# Patient Record
Sex: FEMALE | Race: OTHER
[De-identification: names, ages, dates, MRNs, and addresses within clinical notes are randomized per-mention and may not be internally consistent; named-entity substitution may affect disease eponyms.]

---

## 2019-04-11 ENCOUNTER — HOSPITAL ENCOUNTER (EMERGENCY)
Dept: HOSPITAL 54 - ER | Age: 44
Discharge: HOME | End: 2019-04-11
Payer: MEDICAID

## 2019-04-11 VITALS — HEIGHT: 66 IN | BODY MASS INDEX: 33.75 KG/M2 | WEIGHT: 210 LBS

## 2019-04-11 VITALS — SYSTOLIC BLOOD PRESSURE: 132 MMHG | DIASTOLIC BLOOD PRESSURE: 72 MMHG

## 2019-04-11 DIAGNOSIS — R42: ICD-10-CM

## 2019-04-11 DIAGNOSIS — R07.89: Primary | ICD-10-CM

## 2019-04-11 DIAGNOSIS — R51: ICD-10-CM

## 2019-04-11 LAB
ALBUMIN SERPL BCP-MCNC: 4 G/DL (ref 3.4–5)
ALP SERPL-CCNC: 58 U/L (ref 46–116)
ALT SERPL W P-5'-P-CCNC: 18 U/L (ref 12–78)
AST SERPL W P-5'-P-CCNC: 12 U/L (ref 15–37)
BASOPHILS # BLD AUTO: 0.1 /CMM (ref 0–0.2)
BASOPHILS NFR BLD AUTO: 0.6 % (ref 0–2)
BILIRUB DIRECT SERPL-MCNC: 0.1 MG/DL (ref 0–0.2)
BILIRUB SERPL-MCNC: 0.3 MG/DL (ref 0.2–1)
BUN SERPL-MCNC: 18 MG/DL (ref 7–18)
CALCIUM SERPL-MCNC: 9.3 MG/DL (ref 8.5–10.1)
CHLORIDE SERPL-SCNC: 105 MMOL/L (ref 98–107)
CO2 SERPL-SCNC: 23 MMOL/L (ref 21–32)
CREAT SERPL-MCNC: 1 MG/DL (ref 0.6–1.3)
EOSINOPHIL NFR BLD AUTO: 0 % (ref 0–6)
GLUCOSE SERPL-MCNC: 102 MG/DL (ref 74–106)
HCT VFR BLD AUTO: 37 % (ref 33–45)
HGB BLD-MCNC: 12.3 G/DL (ref 11.5–14.8)
KETONES UR STRIP-MCNC: 15 MG/DL
LIPASE SERPL-CCNC: 114 U/L (ref 73–393)
LYMPHOCYTES NFR BLD AUTO: 13 % (ref 20–44)
LYMPHOCYTES NFR BLD AUTO: 2 /CMM (ref 0.8–4.8)
MCHC RBC AUTO-ENTMCNC: 33 G/DL (ref 31–36)
MCV RBC AUTO: 87 FL (ref 82–100)
MONOCYTES NFR BLD AUTO: 0.7 /CMM (ref 0.1–1.3)
MONOCYTES NFR BLD AUTO: 4.9 % (ref 2–12)
NEUTROPHILS # BLD AUTO: 12.5 /CMM (ref 1.8–8.9)
NEUTROPHILS NFR BLD AUTO: 81.5 % (ref 43–81)
PH UR STRIP: 5.5 [PH] (ref 5–8)
PLATELET # BLD AUTO: 303 /CMM (ref 150–450)
POTASSIUM SERPL-SCNC: 3.9 MMOL/L (ref 3.5–5.1)
PROT SERPL-MCNC: 7.8 G/DL (ref 6.4–8.2)
RBC # BLD AUTO: 4.29 MIL/UL (ref 4–5.2)
RBC #/AREA URNS HPF: (no result) /HPF (ref 0–2)
SODIUM SERPL-SCNC: 142 MMOL/L (ref 136–145)
UROBILINOGEN UR STRIP-MCNC: 0.2 EU/DL
WBC #/AREA URNS HPF: (no result) /HPF (ref 0–3)
WBC NRBC COR # BLD AUTO: 15.4 K/UL (ref 4.3–11)

## 2019-04-11 PROCEDURE — 83690 ASSAY OF LIPASE: CPT

## 2019-04-11 PROCEDURE — 96374 THER/PROPH/DIAG INJ IV PUSH: CPT

## 2019-04-11 PROCEDURE — 84484 ASSAY OF TROPONIN QUANT: CPT

## 2019-04-11 PROCEDURE — 96361 HYDRATE IV INFUSION ADD-ON: CPT

## 2019-04-11 PROCEDURE — 81001 URINALYSIS AUTO W/SCOPE: CPT

## 2019-04-11 PROCEDURE — 93005 ELECTROCARDIOGRAM TRACING: CPT

## 2019-04-11 PROCEDURE — 80076 HEPATIC FUNCTION PANEL: CPT

## 2019-04-11 PROCEDURE — 36415 COLL VENOUS BLD VENIPUNCTURE: CPT

## 2019-04-11 PROCEDURE — 84703 CHORIONIC GONADOTROPIN ASSAY: CPT

## 2019-04-11 PROCEDURE — 96375 TX/PRO/DX INJ NEW DRUG ADDON: CPT

## 2019-04-11 PROCEDURE — 80048 BASIC METABOLIC PNL TOTAL CA: CPT

## 2019-04-11 PROCEDURE — 85025 COMPLETE CBC W/AUTO DIFF WBC: CPT

## 2019-04-11 PROCEDURE — 99284 EMERGENCY DEPT VISIT MOD MDM: CPT

## 2019-04-11 NOTE — NUR
PT PRESENTED TO THE ER WITH A C/O MIDSTERNAL CP SINCE YESTERDAY. PT IS ALSO C/O 
FEELING SOB. PT'S O2 SAT % ON RA. PT IS ABLE TO SPEAK IN FULL SENTENCES. 
NO S/S OF SOB. RESP EVEN AND UNLABORED. PT AMBULATED TO BED #13 WITH A STEADY 
GAIT.

## 2019-04-11 NOTE — NUR
IV removed. Catheter intact and site benign. Pressure and 4x4 applied to site. 
No bleeding noted. Patient discharged to home in stable condition. Written and 
verbal after care instructions given. Patient verbalizes understanding of 
instruction AND RX. PT STATED THAT SHE HAS SOMEONE PICKING HER UP. VSS. NAD 
NOTED. PT AMBULATED OUT WITH A STEADY GAIT.

## 2019-08-20 ENCOUNTER — HOSPITAL ENCOUNTER (EMERGENCY)
Dept: HOSPITAL 54 - ER | Age: 44
Discharge: HOME | End: 2019-08-20
Payer: MEDICAID

## 2019-08-20 VITALS — HEIGHT: 66 IN | BODY MASS INDEX: 35.36 KG/M2 | WEIGHT: 220 LBS

## 2019-08-20 VITALS — SYSTOLIC BLOOD PRESSURE: 131 MMHG | DIASTOLIC BLOOD PRESSURE: 71 MMHG

## 2019-08-20 DIAGNOSIS — R00.0: ICD-10-CM

## 2019-08-20 DIAGNOSIS — K52.9: Primary | ICD-10-CM

## 2019-08-20 LAB
ALBUMIN SERPL BCP-MCNC: 3.8 G/DL (ref 3.4–5)
ALP SERPL-CCNC: 63 U/L (ref 46–116)
ALT SERPL W P-5'-P-CCNC: 25 U/L (ref 12–78)
AST SERPL W P-5'-P-CCNC: 20 U/L (ref 15–37)
BASOPHILS # BLD AUTO: 0 /CMM (ref 0–0.2)
BASOPHILS NFR BLD AUTO: 0.3 % (ref 0–2)
BILIRUB DIRECT SERPL-MCNC: 0.1 MG/DL (ref 0–0.2)
BILIRUB SERPL-MCNC: 0.5 MG/DL (ref 0.2–1)
BILIRUB UR QL STRIP: (no result)
BUN SERPL-MCNC: 17 MG/DL (ref 7–18)
CALCIUM SERPL-MCNC: 8.6 MG/DL (ref 8.5–10.1)
CHLORIDE SERPL-SCNC: 105 MMOL/L (ref 98–107)
CO2 SERPL-SCNC: 23 MMOL/L (ref 21–32)
CREAT SERPL-MCNC: 1 MG/DL (ref 0.6–1.3)
EOSINOPHIL NFR BLD AUTO: 0.8 % (ref 0–6)
GLUCOSE SERPL-MCNC: 104 MG/DL (ref 74–106)
HCT VFR BLD AUTO: 39 % (ref 33–45)
HGB BLD-MCNC: 12.9 G/DL (ref 11.5–14.8)
LIPASE SERPL-CCNC: 103 U/L (ref 73–393)
LYMPHOCYTES NFR BLD AUTO: 0.9 /CMM (ref 0.8–4.8)
LYMPHOCYTES NFR BLD AUTO: 11.5 % (ref 20–44)
MCHC RBC AUTO-ENTMCNC: 33 G/DL (ref 31–36)
MCV RBC AUTO: 87 FL (ref 82–100)
MONOCYTES NFR BLD AUTO: 0.6 /CMM (ref 0.1–1.3)
MONOCYTES NFR BLD AUTO: 7.3 % (ref 2–12)
NEUTROPHILS # BLD AUTO: 6.1 /CMM (ref 1.8–8.9)
NEUTROPHILS NFR BLD AUTO: 80.1 % (ref 43–81)
PH UR STRIP: 5.5 [PH] (ref 5–8)
PLATELET # BLD AUTO: 254 /CMM (ref 150–450)
POTASSIUM SERPL-SCNC: 3.4 MMOL/L (ref 3.5–5.1)
PROT SERPL-MCNC: 7.7 G/DL (ref 6.4–8.2)
RBC # BLD AUTO: 4.44 MIL/UL (ref 4–5.2)
RBC #/AREA URNS HPF: (no result) /HPF (ref 0–2)
SODIUM SERPL-SCNC: 139 MMOL/L (ref 136–145)
UROBILINOGEN UR STRIP-MCNC: 0.2 EU/DL
WBC #/AREA URNS HPF: (no result) /HPF (ref 0–3)
WBC NRBC COR # BLD AUTO: 7.6 K/UL (ref 4.3–11)

## 2019-08-20 PROCEDURE — 96361 HYDRATE IV INFUSION ADD-ON: CPT

## 2019-08-20 PROCEDURE — 93005 ELECTROCARDIOGRAM TRACING: CPT

## 2019-08-20 PROCEDURE — 96374 THER/PROPH/DIAG INJ IV PUSH: CPT

## 2019-08-20 PROCEDURE — 74176 CT ABD & PELVIS W/O CONTRAST: CPT

## 2019-08-20 PROCEDURE — 99284 EMERGENCY DEPT VISIT MOD MDM: CPT

## 2019-08-20 PROCEDURE — 96375 TX/PRO/DX INJ NEW DRUG ADDON: CPT

## 2019-08-20 PROCEDURE — 80076 HEPATIC FUNCTION PANEL: CPT

## 2019-08-20 PROCEDURE — 36415 COLL VENOUS BLD VENIPUNCTURE: CPT

## 2019-08-20 PROCEDURE — 81001 URINALYSIS AUTO W/SCOPE: CPT

## 2019-08-20 PROCEDURE — 96376 TX/PRO/DX INJ SAME DRUG ADON: CPT

## 2019-08-20 PROCEDURE — 84703 CHORIONIC GONADOTROPIN ASSAY: CPT

## 2019-08-20 PROCEDURE — 80048 BASIC METABOLIC PNL TOTAL CA: CPT

## 2019-08-20 PROCEDURE — 85025 COMPLETE CBC W/AUTO DIFF WBC: CPT

## 2019-08-20 PROCEDURE — 83690 ASSAY OF LIPASE: CPT

## 2019-08-20 PROCEDURE — 84484 ASSAY OF TROPONIN QUANT: CPT

## 2019-08-20 NOTE — NUR
IV removed. Catheter intact and site benign. Pressure and 4x4 applied to site. 
No bleeding noted. Patient discharged to home in stable condition. Written and 
verbal after care instructions given. Patient verbalizes understanding of 
instruction. ambulatory with a steady gait noted. pt aaox4 jason cute distress 
noted, resp even and unlabored. advice pt not to drive or operate any machinery 
due to pt was given anrcotic medicine. pt verbalize understanding. pt  
at bedside to take pt home.

## 2019-08-20 NOTE — NUR
TO BED 5 AMBULATORY C/O ABD PAIN X2 DAYS, (+) N/D STARTED TODAY. PT AAOX4 MELIA 
CUTE DISTRESS NOTED, RESP EVEN AND UNLABORED. URINE SAMPLE COLLECTED AND SENT 
TO LAB. PENDING ER MD QUILES.